# Patient Record
Sex: MALE | Race: OTHER | HISPANIC OR LATINO | URBAN - METROPOLITAN AREA
[De-identification: names, ages, dates, MRNs, and addresses within clinical notes are randomized per-mention and may not be internally consistent; named-entity substitution may affect disease eponyms.]

---

## 2020-03-27 ENCOUNTER — EMERGENCY (EMERGENCY)
Facility: HOSPITAL | Age: 45
LOS: 1 days | Discharge: HOME | End: 2020-03-27
Attending: EMERGENCY MEDICINE
Payer: SELF-PAY

## 2020-03-27 VITALS
RESPIRATION RATE: 18 BRPM | OXYGEN SATURATION: 96 % | HEART RATE: 89 BPM | TEMPERATURE: 99 F | SYSTOLIC BLOOD PRESSURE: 152 MMHG | DIASTOLIC BLOOD PRESSURE: 90 MMHG

## 2020-03-27 PROCEDURE — 99283 EMERGENCY DEPT VISIT LOW MDM: CPT

## 2020-03-27 NOTE — ED PROVIDER NOTE - NSFOLLOWUPINSTRUCTIONS_ED_ALL_ED_FT
you appear to have a viral syndrome.  reasons to return to the ED include: worsening shortness of breath, cough, passing out, or feeling like you will pass out.    use tylenol every 4 hours 650 mg and ibuprofen 400 mg every 6 hours for fever and body aches    use loratadine 10 mg to help with nasal drip    use pseudoephedrine 30 mg every 6 hours for nasal congestion    treat the symptoms and return to the ED if any high risk symptoms     otherwise drink lots of fluids and get lots of rest     we did not test you for covid, we assume you have it  so you need to self isolate for 14 days from the onset of symptoms    you have been given the Covid informations sheet from [facility], please follow the directions listed

## 2020-03-27 NOTE — ED PROVIDER NOTE - PATIENT PORTAL LINK FT
You can access the FollowMyHealth Patient Portal offered by [facility] by registering at the following website: [email or web address]. By joining [employer]’s FollowMyHealth portal, you will also be able to view your health information using other applications (apps) compatible with our system.

## 2020-03-27 NOTE — ED PROVIDER NOTE - CLINICAL SUMMARY MEDICAL DECISION MAKING FREE TEXT BOX
The patient does not have high-risk features of a severe infection (severe shortness of breath, light-headedness, or inability to perform ADLSs) or present with any concerning vital signs or symptoms that would indicate imminent respiratory failure.  I have had a long conversation with the patient regarding the need for return to the ED:  worsening cough, shortness of breath, syncope, near-syncope, or any other concerns. The patient does not have high-risk features of a severe infection (severe shortness of breath, light-headedness, or inability to perform ADLSs) or present with any concerning vital signs or symptoms that would indicate imminent respiratory failure.  I have had a long conversation with the patient regarding the need for return to the ED:  worsening cough, shortness of breath, syncope, near-syncope, or any other concerns.  pt had amb pulse ox done.  at rest 98%, after amb (pt ran) 98%  pulse was 82 to 101

## 2020-03-27 NOTE — ED PROVIDER NOTE - PHYSICAL EXAMINATION
pt alert and can phonate well  h at/nc  perrl, conj clear, sclera anicteric,  neck supple  throat no exudate  cor rrr pos s1s2  lungs clear to asno wheeze  abd soft no r/g/t  ext no edema no deformities  neueo awake, lucid normal gait moves all extremities with strength  psych normal affect  vs reasonable

## 2020-03-27 NOTE — ED PROVIDER NOTE - OBJECTIVE STATEMENT
46 y/o M presents to ED c/o cough, and fever, for 5 days. Pt works in construction, lives with a roommate in sperate room. Pt has a friend who tested positive for COVID. Pt endorse mild right sided CP, and ha   Pt denies SOB,  No N/V/D, and abd pain. Pt is a non-smoker.